# Patient Record
Sex: FEMALE | Race: WHITE
[De-identification: names, ages, dates, MRNs, and addresses within clinical notes are randomized per-mention and may not be internally consistent; named-entity substitution may affect disease eponyms.]

---

## 2017-10-24 ENCOUNTER — HOSPITAL ENCOUNTER (OUTPATIENT)
Dept: HOSPITAL 84 - D.CATH | Age: 82
LOS: 1 days | Discharge: HOME | End: 2017-10-25
Attending: INTERNAL MEDICINE
Payer: MEDICARE

## 2017-10-24 VITALS — DIASTOLIC BLOOD PRESSURE: 52 MMHG | SYSTOLIC BLOOD PRESSURE: 156 MMHG

## 2017-10-24 VITALS — BODY MASS INDEX: 26.2 KG/M2 | BODY MASS INDEX: 26.4 KG/M2

## 2017-10-24 VITALS — SYSTOLIC BLOOD PRESSURE: 138 MMHG | DIASTOLIC BLOOD PRESSURE: 64 MMHG

## 2017-10-24 VITALS — DIASTOLIC BLOOD PRESSURE: 49 MMHG | SYSTOLIC BLOOD PRESSURE: 142 MMHG

## 2017-10-24 DIAGNOSIS — E78.5: ICD-10-CM

## 2017-10-24 DIAGNOSIS — Z01.812: ICD-10-CM

## 2017-10-24 DIAGNOSIS — I10: ICD-10-CM

## 2017-10-24 DIAGNOSIS — I25.10: ICD-10-CM

## 2017-10-24 DIAGNOSIS — I49.5: ICD-10-CM

## 2017-10-24 DIAGNOSIS — T82.110A: Primary | ICD-10-CM

## 2017-10-24 DIAGNOSIS — E11.9: ICD-10-CM

## 2017-10-24 LAB
ANION GAP SERPL CALC-SCNC: 13.2 MMOL/L (ref 8–16)
APTT BLD: 30.1 SECONDS (ref 22.8–39.4)
BUN SERPL-MCNC: 39 MG/DL (ref 7–18)
CALCIUM SERPL-MCNC: 9.3 MG/DL (ref 8.5–10.1)
CHLORIDE SERPL-SCNC: 101 MMOL/L (ref 98–107)
CO2 SERPL-SCNC: 26.9 MMOL/L (ref 21–32)
CREAT SERPL-MCNC: 1.5 MG/DL (ref 0.6–1.3)
ERYTHROCYTE [DISTWIDTH] IN BLOOD BY AUTOMATED COUNT: 12.6 % (ref 11.5–14.5)
GLUCOSE SERPL-MCNC: 209 MG/DL (ref 74–106)
HCT VFR BLD CALC: 40.3 % (ref 36–48)
HGB BLD-MCNC: 13.6 G/DL (ref 12–16)
INR PPP: 0.98 (ref 0.85–1.17)
MCH RBC QN AUTO: 34.8 PG (ref 26–34)
MCHC RBC AUTO-ENTMCNC: 33.7 G/DL (ref 31–37)
MCV RBC: 103.1 FL (ref 80–100)
OSMOLALITY SERPL CALC.SUM OF ELEC: 288 MOSM/KG (ref 275–300)
PLATELET # BLD: 183 10X3/UL (ref 130–400)
PMV BLD AUTO: 9.9 FL (ref 7.4–10.4)
POTASSIUM SERPL-SCNC: 4.1 MMOL/L (ref 3.5–5.1)
PROTHROMBIN TIME: 12.9 SECONDS (ref 11.6–15)
RBC # BLD AUTO: 3.91 10X6/UL (ref 4–5.4)
SODIUM SERPL-SCNC: 137 MMOL/L (ref 136–145)
WBC # BLD AUTO: 7.2 10X3/UL (ref 4.8–10.8)

## 2017-10-24 NOTE — HEMODYNAMI
PATIENT:MARIA LUISA RICK                                   MEDICAL RECORD: [id number]
: 39                                            LOCATION:D.CAT          
ACCT# [id number]                                       ADMISSION DATE: 10/24/17
 
 
 Generatedon:10/24/931608:09
Patient name: MARIA LUISA RICK Patient #: [id number] Visit #: [id number] SSN: [id number]

: 1935 Date of study: 10/24/2017
Page: Of
Hemodynamic Procedure Report
****************************
Patient Data
Patient Demographics
Procedure consent was obtained
First Name: MARIA LUISA           Gender: Female
Last Name: MERLINE           : 1935
Middle Initial: J           Age: 82 year(s)
Patient #: [id number]       Race: 
Visit #: [id number]
SSN: 
Accession #:
[id number]
Additional ID: [id number]
Contact details
Address: [address] [place] [address] Phone: [phone]
State: [place]
City: [place]
Zip code: [address]
Past Medical History
Allergies
Allergen        Reaction        Date         Comments
Reported
Penicillins                     10/24/2017
Sulfa drugs                     10/24/2017
Admission
Admission Data
Admission Date: 10/24/2017  Admission Time: 10:39
Procedure
Procedure Types
Cath Procedure
Diagnostic Procedure
PPM/ICD
Lead Replacement Single
Procedure Description
Procedure Date
Procedure Date: 10/24/2017
Procedure Start Time: 15:58
Procedure End Time: 16:05
Procedure Staff
Name                            Function
Heladio Hernandez MD             Performing Physician
Laura Hayes RT             Scrub
Alexa Dan RN                  Nurse
Brown Cam RT                  Monitor
Piter Moy MD             Assisting physician
Procedure Data
Cath Procedure
Fluoroscopy
 
Diagnostic fluoroscopy      Total fluoroscopy Time:
time: 20.6 min              20.6 min
Diagnostic fluoroscopy      Total fluoroscopy dose:
dose: 311.34 mGy            311.34 mGy
Estimated blood loss: 20 ml
Procedure Medications
Medication           Administration Route Dosage
Oxygen               NC                   2 l/min
Lidocaine 1% with    added to field       20 ml
Epi
Bupivacaine 0.5%     S.Q.                 10 ml
0.9% NaCl
Vancomycin           I.V.P.B              1 g
Vancomycin           Topical              1 g
Irrigation
Versed               I.V.                 1 mg
Fentanyl             I.V.                 50 mcg
Versed               I.V.                 1 mg
Fentanyl             I.V.                 50 mcg
Versed               I.V.                 1 mg
Hemodynamics
Rest
Pre Cath      Intra         NCS           Post Cath
Vital Signs
Time     Heart  Resp   SPO2 etCO2   NIBP (mmHg) Rhythm  Pain    Sedation
Rate   (ipm)  (%)  (mmHg)                      Status  Level
(bpm)
14:23:54 75     17     96   0       207/94(141) Paced   0 (11)  10(A)
, No
pain
14:28:45 73     18     96   33.8    214/88(145) Paced   0 (11)  10(A)
, No
pain
14:33:40 78     15     97   35.3    201/91(143) Paced   0 (11)  10(A)
, No
pain
14:38:31 72     12     98   37.5    213/82(126) Paced   0 (11)  10(A)
, No
pain
14:44:29 100    18     96   39      184/84(130) Paced   0 (11)  9(A)
, No
pain
14:49:14 75     39     93   38.3    187/80(127) Paced   0 (11)  9(A)
, No
pain
14:54:01 82     30     97   38.3    170/76(129) Paced   0 (11)  9(A)
, No
pain
14:58:41 70     38     98   37.6    180/76(129) Paced   0 (11)  9(A)
, No
pain
15:03:18 69     18     97   36      178/76(128) Paced   0 (11)  9(A)
, No
pain
15:07:56 75     17     97   36.8    170/70(121) Paced   0 (11)  9(A)
, No
pain
15:12:31 73     16     98   35.3    166/76(118) Paced   0 (11)  9(A)
, No
pain
 
15:17:03 69     15     96   33.8    155/66(117) Paced   0 (11)  9(A)
, No
pain
15:21:38 69     14     98   37.6    155/63(122) Paced   0 (11)  9(A)
, No
pain
15:26:12 69     14     97   39.1    156/70(114) Paced   0 (11)  9(A)
, No
pain
15:30:38 69     18     96   37.6    153/67(118) Paced   0 (11)  9(A)
, No
pain
15:35:13 70     25     98   37.6    160/72(119) Paced   0 (11)  9(A)
, No
pain
15:39:51 77     21     97   36      161/65(119) Paced   0 (11)  9(A)
, No
pain
15:45:21 71     13     98   41.3    148/61(114) Paced   0 (11)  9(A)
, No
pain
15:50:43 93     14     99   39.1    145/69(107) Paced   0 (11)  10(A)
, No
pain
15:56:06 61     18     99   33.8    138/70(107) Paced   0 (11)  10(A)
, No
pain
16:01:22 66     28     99   36      144/69(111) Paced   0 (11)  10(A)
, No
pain
16:05:52 80     13     98   33.8    153/71(106) Paced   0 (11)  10(A)
, No
pain
Medications
Time     Medication  Route   Dose  Verified Delivered Reason     Notes  Effectiv
eness
by       by
14:20:09 Vancomycin  I.V.P.B 1 g   Heladio  Aaronie    used for
[place] Dan RN   procedure
MD
14:34:22 Oxygen      NC      2     Heladio Walkerie    used for
l/min [facility] Dan RN   procedure
MD
14:34:31 Lidocaine   added   20 ml Heladio Leija   for local
1% with Epi to            [facility]  anesthetic
field         MD TOMAS
14:34:45 Bupivacaine S.Q.    10 ml Heladio Leija   for local
0.5%                      [facility]  anesthetic
MD TOMAS
14:34:56 0.9% NaCl           kvo   Heladio Liu    Per
ml/hr [facility] Dan RN   physician
MD
14:35:19 Vancomycin  Topical 1 g   Heladio Walkerie    used for
Irrigation                [facility] Dan RN   procedure
MD
14:40:03 Versed      I.V.    1 mg  Heladio Walkerie    for
[facility] Dan RN   sedation
MD
14:40:09 Fentanyl    I.V.    50    Heladio Walkerie    for
mcg   [facility] RN   sedation
 
MD
15:09:32 Versed      I.V.    1 mg  Heladio Walkerie    for
[facility] Dan RN   sedation
MD
15:09:37 Fentanyl    I.V.    50    Heladio Walkerie    for
mcg   [facility] Dan RN   sedation
MD
15:39:05 Versed      I.V.    1 mg  Heladio Walkerie    for
[facility] Dan RN   sedation
MD
Procedure Log
Time     Note
14:09:07 Alexa Dan RN sent for patient. Start room use.
14:09:08 Time tracking: Regular hours
14:09:12 Plan of Care:Hemodynamics will remain stable., Cardiac
rhythm will remain stable., Comfort level will be
maintained., Respiratory function will remain
adequate., Patient/ family verbilizes understanding of
procedure., Procedure tolerated without complication.,
Recovers from procedure without complications..
14:20:09 Vancomycin 1 g I.V.P.B was administered by Alexa Dan RN; used for procedure;
14:20:32 Patient received from Pre/Post Procedure Room to CCL 3
Alert and oriented. Tansferred to table in Supine
position.
14:20:35 Warm blankets applied, and rajeev hugger turned on for
patient comfort.
14:20:35 Correct patient and procedure confirmed by team.
14:20:40 Signed procedure consent form obtained from patient.
14:20:56 ECG and BP/O2 sat monitors applied to patient.
14:21:05 Vital chart was started
14:21:11 Rhythm: paced
14:21:49 Full Disclosure recording started
14:22:00 H&P Date Dictated: 10/12/2017 Within 30 days and on
chart., H&P Addendum completed by physician on day of
procedure. (MUST COMPLETE FOR ALL OUTPATIENTS).
14:22:02 Pre-procedure instructions explained to patient.
14:22:04 Pre-op teaching completed and patient verbalized
understanding.
14:22:07 Family in waiting room.
14:22:10 Patient NPO since Breakfast.
14:22:21 Patient allergic to Penicillins
14:22:28 Patient allergic to Sulfa drugs
14:22:35 Is the patient allergic to Iodine/contrast media? No.
14:22:38 Is patient on blood thinner?Yes
14:22:44 **ACC** The patient was administered the following
blood thiners within the last 24 hours: **ACC**Plavix
14:23:11 Patient diabetic? Yes.
14:23:52 If diabetic: On Metformin? No
14:24:11 INSULIN DEPENDANT
14:24:42 Patient not pregnant. Patient is over age 55.
14:24:45 ----Pre-sedation anethsthesia assessment.----
14:24:50 Previous problem with sedation/anesthesia? No ?
14:24:51 Snore? Yes
14:24:54 Sleep apnea? No
14:24:55 Deviated septum? No
14:24:56 Opens mouth fully? Yes
14:24:58 Sticks out tongue? Yes
14:25:00 Airway obstruction? No ?
14:25:14 Dentures? Yes IN TIGHT
 
14:25:25 IV patent on arrival in left wrist with 0.9% NaCl at
KVO.
14:25:37 Left chest area was prepped with chlora-prep and
draped in sterile fashion
14:27:20 Alarms reviewed by NICHO PINON
14:28:02 Medtronic 5076-58 PPM Lead opened to sterile field.
14:28:27 Cautery Pushbutton Pencil opened to sterile field.
14:28:28 Immobilizer Sling Small opened to sterile field.
14:28:29 Mepilex Dressing opened to sterile field.
14:28:30 7Fr Safe Sheath opened to sterile field.
14:28:31 Cautery Tip  opened to sterile field.
14:29:27 3.0 Vicryl Single Pack [id number] opened to sterile
field.
14:29:30 5.0 Monocryl PS3 [id number] opened to sterile field.
14:29:31 2.0 Ticron Multipack opened to sterile field.
14:32:55 Medtronic representative ZORAIDA GRIFFIN present for
procedure.
14:34:22 Oxygen 2 l/min NC was administered by Alexa Dan RN;
used for procedure;
14:34:31 Lidocaine 1% with Epi 20 ml added to field was
administered by Heladio Hernandez MD; for local
anesthetic;
14:34:39 Pre sharps counted by scrub and verified by RN:
Sutures: 7 Sponges: 5 Stick needles: 1 Skin needles: 2
Blade: 1 Cautery: 1
14:34:45 Bupivacaine 0.5% 10 ml S.Q. was administered by
Heladio Hernandez MD; for local anesthetic;
14:34:46 Grounding pad site Left thigh.
14:34:56 0.9% NaCl kvo ml/hr was administered by Alexa Dan RN; Per physician;
14::56 Physician arrived
14::57 --------ALL STOP TIME OUT------
14:34:57 Final Timeout: patient, procedure, and site verified
with staff and physician. All members of the team are
in agreement.
14:35:02 Left chest site verified by team.
14:35:19 Vancomycin Irrigation 1 g Topical was administered by
Alexa Dan RN; used for procedure;
14:35:39 Physical assessment completed. ASA score P 2 - A
patient with mild systemic disease as per Piter Moy MD.
14:35:44 Sedation plan: IV Moderate Sedation Versed, Fentanyl
14:37:39 Sharps counted by scrub and verified by R.N.
14:38:37 Procedure started.
14:38:38 Lidocaine 1% w/epi to left subclavicular area by
Piter Moy MD.
14:38:47 Bupivacaine 0.5% to left subclavicular area by
Piter Moy MD.
14:38:59 Incision made to left subclavicular area.
14:40:03 Versed 1 mg I.V. was administered by Alexa Dan RN;
for sedation;
14:40:09 Fentanyl 50 mcg I.V. was administered by Alexa Dan
RN; for sedation;
14:47:34 Device pocket was reopened.
14:52:01 Terumo ANGLE 260cm glide wire opened to sterile field.
14:52:36 Left subclavian vein accessed with 7Fr Peel Away
Sheath.
14:52:51 GLIDE WIRE ADVANCED
14:54:49 7 FR SHORT PEELAWAY SHEATH REMOVED
14:55:25 9Fr Safe Sheath opened to sterile field.
 
14:57:20 Ventricular lead inserted and advanced.
14:57:23 Peel-a-way sheath was split and removed.
15:09:02 LEAD REMOVED INTACT, UNABLE TO INSERT INTO RV
15:09:32 Versed 1 mg I.V. was administered by Alexa Dan RN;
for sedation;
15:09:37 Fentanyl 50 mcg I.V. was administered by Alexa Dan
RN; for sedation;
15:11:55 Left subclavian vein accessed with 7Fr Peel Away
Sheath.
15:14:38 Ventricular lead inserted and advanced.
15:14:41 Peel-a-way sheath was split and removed.
15:25:29 SEVERAL ATTEMPTS TO INSERT LEAD INTO RV
15:32:48 CHOICE PT WIRE ADVANCED INTO LEAD FOR SUPPORT
15:35:43 SEVERAL ATTEMPTS TO INSERT LEAD INTO RV
15:39:05 Versed 1 mg I.V. was administered by Alexa Dan RN;
for sedation;
15:44:21 Ventricular lead tested.
15:45:25 Ventricular lead attachment was completed with 2-0
ticron.
15:48:20 OLD RV LEAD DISCONNECTED FROM DEVICE AND CAPPED.
15:48:37 Ventricular lead positioned.
15:49:02 PPM Dual was attached to lead(s) and inserted into
pocket.
15:52:43 PPM Dual was inserted subcutaneously to left chest.
15:53:17 Generator was sutured in place with 2-0 ticron.
15:55:08 Subcutaneous closure was completed with 3-0 vicryl.
15:58:08 Skin closure was completed with 5-0 monocryl.
15:59:39 Procedure ended.(Physican Out)
16:01:19 Fluoroscopy time 20.60 minutes.
16::27 Fluoroscopy dose: 311.34 mGy
16:: Flurop Dose total: 311.34
16:02:33 Post sharps counted by scrub and verified by RN:
Sutures: 7 Sponges: 10 Stick needles: 3 Skin needles:
2 Blade: 1 Cautery: 1
16:02:59 2 EXTRA STICK WERE OPENED TO FIELD DURING
16:03:22 5 MORE LAPS OPENED DURING
16:03:32 Insertion/operative site no bleeding no hematoma.
16:04:41 Post-op/insertion site Left Chest area dressed using a
Mepilex dressing.
16:05:00 Post left subclavian vein:stable
16:05:08 Post-procedure physical assessment completed. ASA
score P 2 - A patient with mild systemic disease as
per Heladio Hernandez MD.
16:05:14 Post procedure rhythm: paced
16:05:32 Estimated blood loss: 20 ml
16:05:37 Post procedure instruction explained to
patient.Patient verbalizes understanding.
16:05:37 Patient needs reinforcement of post procedure
teaching.
16:05:39 Procedure and supply charges have been captured,
reviewed, submitted and are correct.
16:05:43 Vital chart was stopped
16:05:44 See physician's report for complete and final results.
16:05:49 Report given to PCU.
16:05:54 Patient transfered to PCU with Bed.
16:05:56 Procedure ended.
16:05:56 Full Disclosure recording stopped
16:06:59 End room use (Document Last)
Device Usage
Item Name   Manufacture  Quantity  Catalog     Hospital Part    Current Minimal 
 
Lot# /
Number      Charge   Number  Stock   Stock   Serial#
Code
Medtronic   Medtronic    1         5076-58     [id number]           [id number]  5
5076-58 PPM
Lead
Cautery     [employer]tek     1         [id number]      965412   27054   401648  5
Pushbutton  [employer]
Pencil
Immobilizer Cardinal     1         [id number]    537854   622025  263511  5
Sling Small Health
Mepilex     Cardinal     1         257283      920806 633579  041954  5
Dressing    Health
7Fr Safe    [employer]tek     1         SU7         380999   895264  656738  10
Sheath      [employer]
Cautery Tip [employer]tek     1         [id number]    322629   568529  151705  5
     [employer]
3.0 Vicryl  Ethicon      1         [id number]     877426   711648  953816  5
Single Pack
[id number]
5.0         Ethicon      1         [id number]     078524           997386  5
Monocryl
PS3 [id number]
2.0 Ticron  [employer]icon      1         [id number]  028963   23865   578510  5
Multipack
Terumo      Terumo       1         [id number]      945464   935300  617625  5
ANGLE 260cm
glide wire
9Fr Safe    Microtek     1         SU9         127652   561874  950569  5
Sheath      [employer]
Signature Audit [place]
Stage           Time        Signature      Unsigned
Intra-Procedure 10/24/2017  Brown Cam
4:09:40 PM  RT(R) (CV)
Signatures
Monitor : Brown Cam RT Signature :
______________________________
Date : ______________ Time :
______________
 
 
 
 
 
 
 
 
 
 
 
 
 
 
 
 
[address] [place] [address]

## 2017-10-25 VITALS — DIASTOLIC BLOOD PRESSURE: 43 MMHG | SYSTOLIC BLOOD PRESSURE: 129 MMHG

## 2017-10-25 VITALS — DIASTOLIC BLOOD PRESSURE: 52 MMHG | SYSTOLIC BLOOD PRESSURE: 125 MMHG

## 2017-10-25 VITALS — SYSTOLIC BLOOD PRESSURE: 166 MMHG | DIASTOLIC BLOOD PRESSURE: 55 MMHG

## 2017-10-26 NOTE — OP
PATIENT NAME:  MARIA LUISA RICK                            MEDICAL RECORD: [id number]
:[birth year]                                             LOCATION:D.CAT          
                                                         ADMISSION DATE:        
SURGEON:  PARK ROSAS MD          
 
 
DATE OF OPERATION:  10/24/2017
 
PROCEDURE:  Lead portion of permanent pacemaker placement. 
 
DESCRIPTION OF PROCEDURE:  This is a lead replacement for sensing lead in the
RV.  A pocket was made via Dr. Moy.  It was quite difficult passing lead into
the RV secondary to 5 previously he was spastic to the SVC to these lesions of
the RV itself.  After, when we crossed, we were able to place an active fixation
lead under fluoroscopic guidance of the RV with thresholds down to 1, R waves up
to 9.  The old lead was then capped and the new lead was attached to the
generator and the pocket was closed via Dr. Moy.
 
IMPRESSION:  Successful lead portion of permanent pacemaker placement, ICD.
 
COMPLICATIONS:  None.
 
DISPOSITION:  To the floor, stable.
 
ESTIMATED BLOOD LOSS:  Minimal.
 
TRANSINT:[id number] Voice Confirmation ID: [id number] DOCUMENT ID: [id number]
                                           
                                           PARK ROSAS MD          
 
 
 
Electronically Signed by PARK ROSAS on 10/26/17 at 1346
 
 
 
 
 
 
 
 
 
 
 
 
 
 
 
 
 
CC:                                                             [id number]
DICTATION DATE: 10/24/17 1613     :     10/24/17 1631      DEP CLI 
                                                                      10/25/17
[address] [place] [address]

## 2017-11-07 NOTE — OP
PATIENT NAME:  MARIA LUISA RICK                            MEDICAL RECORD: [id number]
:[birth year]                                             LOCATION:D.CAT          
                                                         ADMISSION DATE:        
SURGEON:  PAUL NAIR MD          
 
 
DATE OF OPERATION:  10/24/2017
 
PREOPERATIVE DIAGNOSES:
1.  Malfunctioning defibrillator lead.
2.  Coronary artery disease.
3.  Sick sinus syndrome.
4.  Hypertension.
5.  Hyperlipidemia.
6.  Diabetes mellitus.
 
POSTOPERATIVE DIAGNOSES:
1.  Malfunctioning defibrillator lead.
2.  Coronary artery disease.
3.  Sick sinus syndrome.
4.  Hypertension.
5.  Hyperlipidemia.
6.  Diabetes mellitus.
 
PROCEDURE:  Left subclavian vein right ventricular lead placement with
fluoroscopic interpretation.
 
SURGEON:  Paul Nair MD
 
CO-SURGEON:  Heladio Hernandez MD
 
REPORT OF OPERATION:  The patient's left chest was prepped and draped in sterile
fashion.  A 20 mL of 1% lidocaine with epinephrine was infused into the
surrounding tissue.  A cutdown was made overlying the indwelling pacer
defibrillator.  We then eviscerated this mechanism out of the skin.  The skin
incision was actually quite low and we were able to tunnel up through the
subcutaneous tissue of the superior aspect of the incision following the other
leads.  At this point, I had to access the left subclavian vein through a small
puncture in the patient's left chest.  A 0.035 Glidewire was needed to advance
through the multiple leads, which were already in place.  We were eventually
able to get down into the patient's right ventricle.  After multiple attempts,
we were finally able to get the lead into position in the right ventricle.  In
order to do this, we had to place a 7-Fijian extended sheath over the guidewire
all the way into the right ventricle and then advance the lead through the
sheath into position.  At this point, Dr. Hernandez was able to fix the lead into
good position.  We then noted that it was working appropriately.  The sheath and
wire were removed.  The pacemaker lead was then pulled through the subcutaneous
tissue to the indwelling defibrillator pocket.  The old lead was removed and the
new lead was affixed to the defibrillator.  The old lead was then capped off. 
We then sutured down the new lead with a 0 Ti-Cron and placed the defibrillator
back into the subcutaneous pouch.  This was sutured down to the pectoral fascia
using another 0 Ti-Cron.  We then irrigated out the wound with antibiotic
solution.  The subcutaneous tissues were reapproximated with interrupted 3-0
Vicryl, and the skin incisions were closed with subcutaneous 5-0 Monocryl.
 
COMPLICATIONS:  None.
 
CONDITION:  Stable.
 
 
 
OPERATIVE REPORT                               [id number]    MARIA LUISA RICK          
 
 
 
ANESTHESIA:  Local MAC.
 
BLOOD LOSS:  Minimal.
 
TRANSINT:[id number] Voice Confirmation ID: [id number] DOCUMENT ID: [id number]
                                           
                                           PAUL NAIR MD          
 
 
 
Electronically Signed by PAUL NAIR on 17 at 1114
 
 
 
 
 
 
 
 
 
 
 
 
 
 
 
 
 
 
 
 
 
 
 
 
 
 
 
 
 
 
 
 
 
 
 
CC:                                                             [id number]
DICTATION DATE: 10/24/17 1604     :     10/24/17 1642      DEP CLI 
                                                                      10/25/17
[address] [place] [address]

## 2019-03-27 ENCOUNTER — HOSPITAL ENCOUNTER (EMERGENCY)
Dept: HOSPITAL 84 - D.ER | Age: 84
Discharge: HOME | End: 2019-03-27
Payer: MEDICARE

## 2019-03-27 VITALS — BODY MASS INDEX: 27.15 KG/M2 | WEIGHT: 138.29 LBS | HEIGHT: 60 IN

## 2019-03-27 VITALS — DIASTOLIC BLOOD PRESSURE: 67 MMHG | SYSTOLIC BLOOD PRESSURE: 146 MMHG

## 2019-03-27 DIAGNOSIS — M79.18: ICD-10-CM

## 2019-03-27 DIAGNOSIS — N64.4: Primary | ICD-10-CM

## 2019-03-27 LAB
BASOPHILS NFR BLD AUTO: 0.4 % (ref 0–2)
EOSINOPHIL NFR BLD: 1.9 % (ref 0–7)
ERYTHROCYTE [DISTWIDTH] IN BLOOD BY AUTOMATED COUNT: 12.2 % (ref 11.5–14.5)
HCT VFR BLD CALC: 43.5 % (ref 36–48)
HGB BLD-MCNC: 14.7 G/DL (ref 12–16)
IMM GRANULOCYTES NFR BLD: 0.1 % (ref 0–5)
LYMPHOCYTES NFR BLD AUTO: 26.2 % (ref 15–50)
MCH RBC QN AUTO: 34 PG (ref 26–34)
MCHC RBC AUTO-ENTMCNC: 33.8 G/DL (ref 31–37)
MCV RBC: 100.7 FL (ref 80–100)
MONOCYTES NFR BLD: 7.1 % (ref 2–11)
NEUTROPHILS NFR BLD AUTO: 64.3 % (ref 40–80)
PLATELET # BLD: 191 10X3/UL (ref 130–400)
PMV BLD AUTO: 10.3 FL (ref 7.4–10.4)
RBC # BLD AUTO: 4.32 10X6/UL (ref 4–5.4)
WBC # BLD AUTO: 8.5 10X3/UL (ref 4.8–10.8)